# Patient Record
Sex: MALE | Race: WHITE | NOT HISPANIC OR LATINO
[De-identification: names, ages, dates, MRNs, and addresses within clinical notes are randomized per-mention and may not be internally consistent; named-entity substitution may affect disease eponyms.]

---

## 2015-06-10 RX ORDER — ATORVASTATIN CALCIUM 80 MG/1
1 TABLET, FILM COATED ORAL
Qty: 0 | Refills: 0 | DISCHARGE
Start: 2015-06-10

## 2016-01-15 RX ORDER — TAMSULOSIN HYDROCHLORIDE 0.4 MG/1
1 CAPSULE ORAL
Qty: 0 | Refills: 0 | DISCHARGE
Start: 2016-01-15

## 2017-01-20 ENCOUNTER — NON-APPOINTMENT (OUTPATIENT)
Age: 72
End: 2017-01-20

## 2017-01-20 ENCOUNTER — APPOINTMENT (OUTPATIENT)
Dept: ELECTROPHYSIOLOGY | Facility: CLINIC | Age: 72
End: 2017-01-20

## 2017-01-20 VITALS
HEART RATE: 84 BPM | SYSTOLIC BLOOD PRESSURE: 150 MMHG | WEIGHT: 210 LBS | DIASTOLIC BLOOD PRESSURE: 85 MMHG | BODY MASS INDEX: 33.75 KG/M2 | HEIGHT: 66 IN | OXYGEN SATURATION: 98 %

## 2017-04-24 ENCOUNTER — APPOINTMENT (OUTPATIENT)
Dept: ELECTROPHYSIOLOGY | Facility: CLINIC | Age: 72
End: 2017-04-24

## 2017-07-21 ENCOUNTER — NON-APPOINTMENT (OUTPATIENT)
Age: 72
End: 2017-07-21

## 2017-07-21 ENCOUNTER — APPOINTMENT (OUTPATIENT)
Dept: ELECTROPHYSIOLOGY | Facility: CLINIC | Age: 72
End: 2017-07-21

## 2017-07-21 VITALS — DIASTOLIC BLOOD PRESSURE: 88 MMHG | HEART RATE: 82 BPM | OXYGEN SATURATION: 95 % | SYSTOLIC BLOOD PRESSURE: 160 MMHG

## 2017-10-23 ENCOUNTER — APPOINTMENT (OUTPATIENT)
Dept: ELECTROPHYSIOLOGY | Facility: CLINIC | Age: 72
End: 2017-10-23
Payer: MEDICARE

## 2017-10-23 PROCEDURE — 93295 DEV INTERROG REMOTE 1/2/MLT: CPT

## 2018-01-26 ENCOUNTER — APPOINTMENT (OUTPATIENT)
Dept: ELECTROPHYSIOLOGY | Facility: CLINIC | Age: 73
End: 2018-01-26
Payer: MEDICARE

## 2018-01-26 VITALS
DIASTOLIC BLOOD PRESSURE: 98 MMHG | OXYGEN SATURATION: 97 % | HEART RATE: 88 BPM | BODY MASS INDEX: 35.36 KG/M2 | SYSTOLIC BLOOD PRESSURE: 180 MMHG | WEIGHT: 220 LBS | HEIGHT: 66 IN

## 2018-01-26 PROCEDURE — 93283 PRGRMG EVAL IMPLANTABLE DFB: CPT

## 2018-04-23 ENCOUNTER — APPOINTMENT (OUTPATIENT)
Dept: ELECTROPHYSIOLOGY | Facility: CLINIC | Age: 73
End: 2018-04-23

## 2018-04-30 ENCOUNTER — APPOINTMENT (OUTPATIENT)
Dept: ELECTROPHYSIOLOGY | Facility: CLINIC | Age: 73
End: 2018-04-30
Payer: MEDICARE

## 2018-04-30 PROCEDURE — 93296 REM INTERROG EVL PM/IDS: CPT

## 2018-04-30 PROCEDURE — 93295 DEV INTERROG REMOTE 1/2/MLT: CPT

## 2018-07-30 ENCOUNTER — APPOINTMENT (OUTPATIENT)
Dept: ELECTROPHYSIOLOGY | Facility: CLINIC | Age: 73
End: 2018-07-30
Payer: MEDICARE

## 2018-07-30 VITALS — SYSTOLIC BLOOD PRESSURE: 164 MMHG | OXYGEN SATURATION: 98 % | HEART RATE: 79 BPM | DIASTOLIC BLOOD PRESSURE: 96 MMHG

## 2018-07-30 PROCEDURE — 93283 PRGRMG EVAL IMPLANTABLE DFB: CPT

## 2018-10-15 ENCOUNTER — APPOINTMENT (OUTPATIENT)
Dept: ELECTROPHYSIOLOGY | Facility: CLINIC | Age: 73
End: 2018-10-15
Payer: MEDICARE

## 2018-10-15 PROCEDURE — 93296 REM INTERROG EVL PM/IDS: CPT

## 2018-10-15 PROCEDURE — 93295 DEV INTERROG REMOTE 1/2/MLT: CPT

## 2019-04-23 ENCOUNTER — APPOINTMENT (OUTPATIENT)
Dept: ELECTROPHYSIOLOGY | Facility: CLINIC | Age: 74
End: 2019-04-23
Payer: MEDICARE

## 2019-04-23 PROCEDURE — 93295 DEV INTERROG REMOTE 1/2/MLT: CPT

## 2019-04-23 PROCEDURE — 93296 REM INTERROG EVL PM/IDS: CPT

## 2019-07-24 ENCOUNTER — APPOINTMENT (OUTPATIENT)
Dept: ELECTROPHYSIOLOGY | Facility: HOSPITAL | Age: 74
End: 2019-07-24
Payer: MEDICARE

## 2019-07-24 PROCEDURE — 93296 REM INTERROG EVL PM/IDS: CPT

## 2019-07-24 PROCEDURE — 93295 DEV INTERROG REMOTE 1/2/MLT: CPT

## 2019-10-24 ENCOUNTER — APPOINTMENT (OUTPATIENT)
Dept: ELECTROPHYSIOLOGY | Facility: CLINIC | Age: 74
End: 2019-10-24
Payer: MEDICARE

## 2019-10-24 PROCEDURE — 93296 REM INTERROG EVL PM/IDS: CPT

## 2019-10-24 PROCEDURE — 93295 DEV INTERROG REMOTE 1/2/MLT: CPT

## 2020-01-23 ENCOUNTER — APPOINTMENT (OUTPATIENT)
Dept: ELECTROPHYSIOLOGY | Facility: CLINIC | Age: 75
End: 2020-01-23
Payer: MEDICARE

## 2020-01-23 PROCEDURE — 93296 REM INTERROG EVL PM/IDS: CPT

## 2020-01-23 PROCEDURE — 93295 DEV INTERROG REMOTE 1/2/MLT: CPT

## 2020-04-24 ENCOUNTER — APPOINTMENT (OUTPATIENT)
Dept: ELECTROPHYSIOLOGY | Facility: CLINIC | Age: 75
End: 2020-04-24
Payer: MEDICARE

## 2020-04-24 PROCEDURE — 93295 DEV INTERROG REMOTE 1/2/MLT: CPT

## 2020-04-24 PROCEDURE — 93296 REM INTERROG EVL PM/IDS: CPT

## 2020-07-24 ENCOUNTER — APPOINTMENT (OUTPATIENT)
Dept: ELECTROPHYSIOLOGY | Facility: CLINIC | Age: 75
End: 2020-07-24
Payer: MEDICARE

## 2020-07-24 PROCEDURE — 93296 REM INTERROG EVL PM/IDS: CPT

## 2020-07-24 PROCEDURE — 93295 DEV INTERROG REMOTE 1/2/MLT: CPT

## 2020-10-26 ENCOUNTER — APPOINTMENT (OUTPATIENT)
Dept: ELECTROPHYSIOLOGY | Facility: CLINIC | Age: 75
End: 2020-10-26
Payer: MEDICARE

## 2020-10-26 PROCEDURE — 93295 DEV INTERROG REMOTE 1/2/MLT: CPT

## 2020-10-26 PROCEDURE — 93296 REM INTERROG EVL PM/IDS: CPT

## 2021-02-03 ENCOUNTER — APPOINTMENT (OUTPATIENT)
Dept: ELECTROPHYSIOLOGY | Facility: CLINIC | Age: 76
End: 2021-02-03
Payer: MEDICARE

## 2021-02-03 PROCEDURE — 93295 DEV INTERROG REMOTE 1/2/MLT: CPT

## 2021-02-03 PROCEDURE — 93296 REM INTERROG EVL PM/IDS: CPT

## 2021-02-05 ENCOUNTER — NON-APPOINTMENT (OUTPATIENT)
Age: 76
End: 2021-02-05

## 2021-05-05 ENCOUNTER — NON-APPOINTMENT (OUTPATIENT)
Age: 76
End: 2021-05-05

## 2021-05-05 ENCOUNTER — APPOINTMENT (OUTPATIENT)
Dept: ELECTROPHYSIOLOGY | Facility: CLINIC | Age: 76
End: 2021-05-05
Payer: MEDICARE

## 2021-05-05 PROCEDURE — 93296 REM INTERROG EVL PM/IDS: CPT

## 2021-05-05 PROCEDURE — 93295 DEV INTERROG REMOTE 1/2/MLT: CPT

## 2021-08-03 ENCOUNTER — NON-APPOINTMENT (OUTPATIENT)
Age: 76
End: 2021-08-03

## 2021-08-03 ENCOUNTER — APPOINTMENT (OUTPATIENT)
Dept: ELECTROPHYSIOLOGY | Facility: CLINIC | Age: 76
End: 2021-08-03
Payer: MEDICARE

## 2021-08-03 PROCEDURE — 93296 REM INTERROG EVL PM/IDS: CPT

## 2021-08-03 PROCEDURE — 93295 DEV INTERROG REMOTE 1/2/MLT: CPT

## 2021-12-14 ENCOUNTER — NON-APPOINTMENT (OUTPATIENT)
Age: 76
End: 2021-12-14

## 2021-12-14 ENCOUNTER — APPOINTMENT (OUTPATIENT)
Dept: ELECTROPHYSIOLOGY | Facility: CLINIC | Age: 76
End: 2021-12-14
Payer: MEDICARE

## 2021-12-14 PROCEDURE — 93296 REM INTERROG EVL PM/IDS: CPT | Mod: NC

## 2021-12-14 PROCEDURE — 93295 DEV INTERROG REMOTE 1/2/MLT: CPT | Mod: NC

## 2022-03-07 ENCOUNTER — NON-APPOINTMENT (OUTPATIENT)
Age: 77
End: 2022-03-07

## 2022-03-17 ENCOUNTER — APPOINTMENT (OUTPATIENT)
Dept: ELECTROPHYSIOLOGY | Facility: CLINIC | Age: 77
End: 2022-03-17
Payer: MEDICARE

## 2022-03-17 ENCOUNTER — NON-APPOINTMENT (OUTPATIENT)
Age: 77
End: 2022-03-17

## 2022-03-17 PROCEDURE — 93296 REM INTERROG EVL PM/IDS: CPT

## 2022-03-17 PROCEDURE — 93295 DEV INTERROG REMOTE 1/2/MLT: CPT

## 2022-04-15 ENCOUNTER — NON-APPOINTMENT (OUTPATIENT)
Age: 77
End: 2022-04-15

## 2022-06-16 ENCOUNTER — APPOINTMENT (OUTPATIENT)
Dept: ELECTROPHYSIOLOGY | Facility: CLINIC | Age: 77
End: 2022-06-16
Payer: MEDICARE

## 2022-06-16 ENCOUNTER — NON-APPOINTMENT (OUTPATIENT)
Age: 77
End: 2022-06-16

## 2022-06-16 PROCEDURE — 93296 REM INTERROG EVL PM/IDS: CPT

## 2022-06-16 PROCEDURE — 93295 DEV INTERROG REMOTE 1/2/MLT: CPT

## 2022-09-19 ENCOUNTER — NON-APPOINTMENT (OUTPATIENT)
Age: 77
End: 2022-09-19

## 2022-09-19 ENCOUNTER — APPOINTMENT (OUTPATIENT)
Dept: ELECTROPHYSIOLOGY | Facility: CLINIC | Age: 77
End: 2022-09-19

## 2022-09-19 PROCEDURE — 93295 DEV INTERROG REMOTE 1/2/MLT: CPT

## 2022-09-19 PROCEDURE — 93296 REM INTERROG EVL PM/IDS: CPT

## 2022-09-28 ENCOUNTER — NON-APPOINTMENT (OUTPATIENT)
Age: 77
End: 2022-09-28

## 2022-09-28 RX ORDER — SITAGLIPTIN AND METFORMIN HYDROCHLORIDE 50; 1000 MG/1; MG/1
50-1000 TABLET, FILM COATED ORAL TWICE DAILY
Refills: 0 | Status: DISCONTINUED | COMMUNITY
Start: 2017-01-20 | End: 2022-09-28

## 2022-12-02 ENCOUNTER — NON-APPOINTMENT (OUTPATIENT)
Age: 77
End: 2022-12-02

## 2022-12-02 ENCOUNTER — APPOINTMENT (OUTPATIENT)
Dept: ELECTROPHYSIOLOGY | Facility: CLINIC | Age: 77
End: 2022-12-02

## 2022-12-02 VITALS
HEIGHT: 66 IN | WEIGHT: 200 LBS | HEART RATE: 71 BPM | BODY MASS INDEX: 32.14 KG/M2 | DIASTOLIC BLOOD PRESSURE: 82 MMHG | SYSTOLIC BLOOD PRESSURE: 147 MMHG | OXYGEN SATURATION: 95 %

## 2022-12-02 DIAGNOSIS — Z78.9 OTHER SPECIFIED HEALTH STATUS: ICD-10-CM

## 2022-12-02 DIAGNOSIS — Z95.810 PRESENCE OF AUTOMATIC (IMPLANTABLE) CARDIAC DEFIBRILLATOR: ICD-10-CM

## 2022-12-02 DIAGNOSIS — Z86.79 PERSONAL HISTORY OF OTHER DISEASES OF THE CIRCULATORY SYSTEM: ICD-10-CM

## 2022-12-02 DIAGNOSIS — Z45.02 ENCOUNTER FOR ADJUSTMENT AND MANAGEMENT OF AUTOMATIC IMPLANTABLE CARDIAC DEFIBRILLATOR: ICD-10-CM

## 2022-12-02 DIAGNOSIS — I10 ESSENTIAL (PRIMARY) HYPERTENSION: ICD-10-CM

## 2022-12-02 DIAGNOSIS — E78.5 HYPERLIPIDEMIA, UNSPECIFIED: ICD-10-CM

## 2022-12-02 PROCEDURE — 93000 ELECTROCARDIOGRAM COMPLETE: CPT

## 2022-12-02 PROCEDURE — 99214 OFFICE O/P EST MOD 30 MIN: CPT

## 2022-12-02 RX ORDER — ATORVASTATIN CALCIUM 40 MG/1
40 TABLET, FILM COATED ORAL
Qty: 90 | Refills: 1 | Status: ACTIVE | COMMUNITY
Start: 2017-07-21

## 2022-12-02 RX ORDER — CARVEDILOL 6.25 MG/1
6.25 TABLET, FILM COATED ORAL
Qty: 180 | Refills: 0 | Status: DISCONTINUED | COMMUNITY
Start: 2022-09-21

## 2022-12-02 RX ORDER — SOTALOL HYDROCHLORIDE 80 MG/1
80 TABLET ORAL
Refills: 0 | Status: ACTIVE | COMMUNITY
Start: 2022-09-28

## 2022-12-02 RX ORDER — DAPAGLIFLOZIN 5 MG/1
5 TABLET, FILM COATED ORAL
Qty: 90 | Refills: 1 | Status: ACTIVE | COMMUNITY
Start: 2022-12-02

## 2022-12-02 RX ORDER — METFORMIN ER 500 MG 500 MG/1
500 TABLET ORAL TWICE DAILY
Refills: 0 | Status: DISCONTINUED | COMMUNITY
Start: 2022-09-28 | End: 2022-12-02

## 2022-12-02 NOTE — HISTORY OF PRESENT ILLNESS
[FreeTextEntry1] : Mr. Gardiner is a 76 year old male with past medical history of hypertension, hyperlipidemia, atrial fibrillation status post cardioversion and ablation, heart failure with an EF of 29%, s/P ICD implant in 2015 who presents today for follow up visit. States the battery nearing SATISH, less than one month. ICD monitored by Dr Hernandes. On Sotalol 80 mg, Eliquis, and coreg. No s/s bleeding. Denies any chest pain, sob, palpitation or syncope. No ICD shocks. \par \par

## 2022-12-02 NOTE — DISCUSSION/SUMMARY
[EKG obtained to assist in diagnosis and management of assessed problem(s)] : EKG obtained to assist in diagnosis and management of assessed problem(s) [FreeTextEntry1] : Impression:\par \par 1. Chronic systolic CHF: s/p ICD placement. EKG performed today to assess for presence of appropriate pacing and reveals NSR. ICD now at SATISH. Recommend undergoing routine ICD gen change. We discussed the procedures, risks and outcomes of ICD generator change an living with an ICD. We discussed management of ICD therapy throughout life, including deactivation of the ICD. After all questions were answered, and literature was provided, it was a shared decision to proceed with ICD therapy. Hold diabetes medication and blood thinners the morning of the procedure, if applicable. May take all other medication with a small sip of water.\par \par 2. HTN: resume oral antihypertensives as prescribed. Encouraged heart healthy diet, sodium restriction, and weight loss. Continue regular f/u with Cardiologist for further HTN management.\par \par 3. HLD: resume statin therapy as prescribed and regular f/u with Cardiologist for routine lipid monitoring and management.\par \par Plan for ICD gen change.

## 2022-12-19 ENCOUNTER — NON-APPOINTMENT (OUTPATIENT)
Age: 77
End: 2022-12-19

## 2022-12-19 ENCOUNTER — APPOINTMENT (OUTPATIENT)
Dept: ELECTROPHYSIOLOGY | Facility: CLINIC | Age: 77
End: 2022-12-19
Payer: MEDICARE

## 2022-12-19 PROCEDURE — 93296 REM INTERROG EVL PM/IDS: CPT

## 2022-12-19 PROCEDURE — 93295 DEV INTERROG REMOTE 1/2/MLT: CPT

## 2023-01-13 ENCOUNTER — OUTPATIENT (OUTPATIENT)
Dept: OUTPATIENT SERVICES | Facility: HOSPITAL | Age: 78
LOS: 1 days | End: 2023-01-13

## 2023-01-13 VITALS
HEART RATE: 72 BPM | DIASTOLIC BLOOD PRESSURE: 85 MMHG | TEMPERATURE: 98 F | WEIGHT: 205.91 LBS | SYSTOLIC BLOOD PRESSURE: 154 MMHG | HEIGHT: 66 IN | RESPIRATION RATE: 16 BRPM

## 2023-01-13 DIAGNOSIS — E11.9 TYPE 2 DIABETES MELLITUS WITHOUT COMPLICATIONS: ICD-10-CM

## 2023-01-13 DIAGNOSIS — I50.22 CHRONIC SYSTOLIC (CONGESTIVE) HEART FAILURE: ICD-10-CM

## 2023-01-13 DIAGNOSIS — Z98.89 OTHER SPECIFIED POSTPROCEDURAL STATES: Chronic | ICD-10-CM

## 2023-01-13 DIAGNOSIS — I48.91 UNSPECIFIED ATRIAL FIBRILLATION: ICD-10-CM

## 2023-01-13 DIAGNOSIS — I10 ESSENTIAL (PRIMARY) HYPERTENSION: ICD-10-CM

## 2023-01-13 DIAGNOSIS — Z95.810 PRESENCE OF AUTOMATIC (IMPLANTABLE) CARDIAC DEFIBRILLATOR: Chronic | ICD-10-CM

## 2023-01-13 LAB
A1C WITH ESTIMATED AVERAGE GLUCOSE RESULT: 6.8 % — HIGH (ref 4–5.6)
ALBUMIN SERPL ELPH-MCNC: 3.9 G/DL — SIGNIFICANT CHANGE UP (ref 3.3–5)
ALP SERPL-CCNC: 49 U/L — SIGNIFICANT CHANGE UP (ref 40–120)
ALT FLD-CCNC: 34 U/L — SIGNIFICANT CHANGE UP (ref 4–41)
ANION GAP SERPL CALC-SCNC: 10 MMOL/L — SIGNIFICANT CHANGE UP (ref 7–14)
AST SERPL-CCNC: 24 U/L — SIGNIFICANT CHANGE UP (ref 4–40)
BILIRUB SERPL-MCNC: 0.6 MG/DL — SIGNIFICANT CHANGE UP (ref 0.2–1.2)
BLD GP AB SCN SERPL QL: NEGATIVE — SIGNIFICANT CHANGE UP
BUN SERPL-MCNC: 16 MG/DL — SIGNIFICANT CHANGE UP (ref 7–23)
CALCIUM SERPL-MCNC: 9.1 MG/DL — SIGNIFICANT CHANGE UP (ref 8.4–10.5)
CHLORIDE SERPL-SCNC: 103 MMOL/L — SIGNIFICANT CHANGE UP (ref 98–107)
CO2 SERPL-SCNC: 27 MMOL/L — SIGNIFICANT CHANGE UP (ref 22–31)
CREAT SERPL-MCNC: 0.99 MG/DL — SIGNIFICANT CHANGE UP (ref 0.5–1.3)
EGFR: 78 ML/MIN/1.73M2 — SIGNIFICANT CHANGE UP
ESTIMATED AVERAGE GLUCOSE: 148 — SIGNIFICANT CHANGE UP
GLUCOSE SERPL-MCNC: 173 MG/DL — HIGH (ref 70–99)
HCT VFR BLD CALC: 42.1 % — SIGNIFICANT CHANGE UP (ref 39–50)
HGB BLD-MCNC: 13.9 G/DL — SIGNIFICANT CHANGE UP (ref 13–17)
MCHC RBC-ENTMCNC: 30.5 PG — SIGNIFICANT CHANGE UP (ref 27–34)
MCHC RBC-ENTMCNC: 33 GM/DL — SIGNIFICANT CHANGE UP (ref 32–36)
MCV RBC AUTO: 92.5 FL — SIGNIFICANT CHANGE UP (ref 80–100)
NRBC # BLD: 0 /100 WBCS — SIGNIFICANT CHANGE UP (ref 0–0)
NRBC # FLD: 0 K/UL — SIGNIFICANT CHANGE UP (ref 0–0)
PLATELET # BLD AUTO: 188 K/UL — SIGNIFICANT CHANGE UP (ref 150–400)
POTASSIUM SERPL-MCNC: 4.1 MMOL/L — SIGNIFICANT CHANGE UP (ref 3.5–5.3)
POTASSIUM SERPL-SCNC: 4.1 MMOL/L — SIGNIFICANT CHANGE UP (ref 3.5–5.3)
PROT SERPL-MCNC: 7.3 G/DL — SIGNIFICANT CHANGE UP (ref 6–8.3)
RBC # BLD: 4.55 M/UL — SIGNIFICANT CHANGE UP (ref 4.2–5.8)
RBC # FLD: 13.4 % — SIGNIFICANT CHANGE UP (ref 10.3–14.5)
RH IG SCN BLD-IMP: POSITIVE — SIGNIFICANT CHANGE UP
SODIUM SERPL-SCNC: 140 MMOL/L — SIGNIFICANT CHANGE UP (ref 135–145)
WBC # BLD: 7.52 K/UL — SIGNIFICANT CHANGE UP (ref 3.8–10.5)
WBC # FLD AUTO: 7.52 K/UL — SIGNIFICANT CHANGE UP (ref 3.8–10.5)

## 2023-01-13 NOTE — H&P PST ADULT - PROBLEM SELECTOR PLAN 3
The patient was instructed by cardiology to hold Farxiga 4 days preop and to hold Metformin the morning of surgery.

## 2023-01-13 NOTE — H&P PST ADULT - PROBLEM SELECTOR PLAN 1
Pt scheduled for surgery on 1/26/23.  Pre-op instructions provided. Pt verbalized understanding.   Pt given detailed verbal and written instructions on chlorhexidine wash. Pt verbalized understanding with teachback.   Order placed for preop COVID PCR testing. Pt scheduled for surgery on 1/26/23.  Pre-op instructions provided. Pt verbalized understanding.   Pt given detailed verbal and written instructions on chlorhexidine wash. Pt verbalized understanding with teachback.   Order placed for preop COVID PCR testing.  ROSEANN precautions. Pt with >3 criteria on STOP-Bang Questionairre. Pt scheduled for surgery on 1/26/23.  Pre-op instructions provided. Pt verbalized understanding.   Pt given detailed verbal and written instructions on chlorhexidine wash. Pt verbalized understanding with teachback.   Order placed for preop COVID PCR testing.  ROSEANN precautions. Pt with >3 criteria on STOP-Bang Questionairre.  OR booking notified of AICD

## 2023-01-13 NOTE — H&P PST ADULT - TEMPERATURE IN CELSIUS (DEGREES C)
Return to the ER for worsening pain or further concerns  Take tylenol or motrin for pain relief
36.9

## 2023-01-13 NOTE — H&P PST ADULT - NSICDXFAMILYHX_GEN_ALL_CORE_FT
FAMILY HISTORY:  Father  Still living? Unknown  Family history of heart attack, Age at diagnosis: Age Unknown    Mother  Still living? Unknown  FH: ovarian cancer, Age at diagnosis: Age Unknown    Sibling  Still living? No  Family history of heart attack, Age at diagnosis: Age Unknown

## 2023-01-13 NOTE — H&P PST ADULT - HISTORY OF PRESENT ILLNESS
77 year old male with hx of heart failure s/p ICD in 2015 presents today for presurgical evaluation for .... 77 year old male with hx of heart failure s/p ICD in 2015 presents today for presurgical evaluation for MDT ICD Gen Change.

## 2023-01-13 NOTE — H&P PST ADULT - NSANTHOSAYNRD_GEN_A_CORE
No. ROSEANN screening performed.  STOP BANG Legend: 0-2 = LOW Risk; 3-4 = INTERMEDIATE Risk; 5-8 = HIGH Risk

## 2023-01-13 NOTE — H&P PST ADULT - NSICDXPASTMEDICALHX_GEN_ALL_CORE_FT
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

## 2023-01-13 NOTE — H&P PST ADULT - PROBLEM SELECTOR PLAN 2
Pt was instructed by cardiology to hold Eliquis the day before and day of surgery.  Pt instructed to take his Sotolol the morning of surgery.

## 2023-01-26 ENCOUNTER — OUTPATIENT (OUTPATIENT)
Dept: OUTPATIENT SERVICES | Facility: HOSPITAL | Age: 78
LOS: 1 days | Discharge: HOME | End: 2023-01-26
Payer: MEDICARE

## 2023-01-26 DIAGNOSIS — Z95.810 PRESENCE OF AUTOMATIC (IMPLANTABLE) CARDIAC DEFIBRILLATOR: Chronic | ICD-10-CM

## 2023-01-26 DIAGNOSIS — Z98.89 OTHER SPECIFIED POSTPROCEDURAL STATES: Chronic | ICD-10-CM

## 2023-01-26 DIAGNOSIS — I50.22 CHRONIC SYSTOLIC (CONGESTIVE) HEART FAILURE: ICD-10-CM

## 2023-01-26 LAB
GLUCOSE BLDC GLUCOMTR-MCNC: 146 MG/DL — HIGH (ref 70–99)
GLUCOSE BLDC GLUCOMTR-MCNC: 163 MG/DL — HIGH (ref 70–99)

## 2023-01-26 PROCEDURE — 93010 ELECTROCARDIOGRAM REPORT: CPT

## 2023-01-26 PROCEDURE — 33263 RMVL & RPLCMT DFB GEN 2 LEAD: CPT

## 2023-01-26 RX ORDER — SODIUM CHLORIDE 9 MG/ML
3 INJECTION INTRAMUSCULAR; INTRAVENOUS; SUBCUTANEOUS EVERY 8 HOURS
Refills: 0 | Status: DISCONTINUED | OUTPATIENT
Start: 2023-01-26 | End: 2023-02-09

## 2023-01-26 NOTE — CHART NOTE - NSCHARTNOTEFT_GEN_A_CORE
In brief this is a 78 y/o M with PMH of Atrial fibrillation(on Eliquis), DM type II, HTN, HLD, BPH, NICM(s/p AICD implant in 2015) presented to [facility] for AICD generator change. Patient stated that he has been in his usual state of health and denied any current complaints such as CP, SOB, palpitations, VALLES, lightheadedness/dizziness. Reviewed medication list with patient and updated on patient's chart. Explained about the procedure in detail to the patient and daughter at bedside. All risks/benefits explained and patient understood and signed all the consents. Reviewed pre-surgical testing H&P. Patient last took his Farxiga on 01/22/23 and Eliquis on 01/24/23.     EKG: In brief this is a 76 y/o M with PMH of Atrial fibrillation(on Eliquis), DM type II, HTN, HLD, BPH, NICM(s/p AICD implant in 2015) presented to [facility] for AICD generator change. Patient stated that he has been in his usual state of health and denied any current complaints such as CP, SOB, palpitations, VALLES, lightheadedness/dizziness. Reviewed medication list with patient and updated on patient's chart. Explained about the procedure in detail to the patient and daughter at bedside. All risks/benefits explained and patient understood and signed all the consents. Reviewed pre-surgical testing H&P. Patient last took his Farxiga on 01/22/23 and Eliquis on 01/24/23.     COVID PCR not detected on 01/23/23    EKG: Sinus rhythm with 1st degree AV block at a rate of 65 with QTc of 480

## 2023-01-26 NOTE — CHART NOTE - NSCHARTNOTEFT_GEN_A_CORE
Spoke with Dr. Torres and he recommended that patient can restart his Eliquis medication in 1 week and can restart his Farxiga tomorrow morning(1/27). This was relayed to patient on discharge paperwork.

## 2023-01-26 NOTE — CHART NOTE - NSCHARTNOTEFT_GEN_A_CORE
pt. s/p ICD PG change    L ACW site  was pressure dressed. It was  clean, dry, and intact. No bleeding, drainage hematoma, or ecchymosis appreciated.        Post-op ICD PG change instruction has been verbally explained and given to the patient. Patient was also given home monitor, booklet and ID card. Patient expressed understanding and all questions were answered. A copy of the instruction is located in the patients chart   Patient is schedule for an appointment on 2/10/23 at 11:40am in the EP Clinic ( 4th floor Oncology building [facility] [address], [place], [place], [address] [phone] )    No scrubbing the incision site for 2 weeks  No lotion, ointment, powder or direct sunlight to the incision site for 2 weeks   No lifting more than 5lb or exertional exercising such as jogging, running, bike riding for 6-8 weeks  Do not get the surgical incision wet for 1 week  No swimming pool, Jacuzzi, or bath for 6-8 weeks.   You should carry your ID card for metal detectors   Remove bandage after 24-48hours  Patient can shower 24 hours after procedure. Pat the area dry  Keep Cellular phone 6 in away  from the device  Pt was instructed to call [phone] if the following occurs:      - fever with temperature > 100.6      - swelling, drainage or bleeding at the site incision       - chest pain, SOB, n/v      - if you believe you were shock and then go to the ED

## 2023-02-10 ENCOUNTER — APPOINTMENT (OUTPATIENT)
Dept: ELECTROPHYSIOLOGY | Facility: CLINIC | Age: 78
End: 2023-02-10
Payer: MEDICARE

## 2023-02-10 VITALS — RESPIRATION RATE: 14 BRPM | HEART RATE: 100 BPM | SYSTOLIC BLOOD PRESSURE: 157 MMHG | DIASTOLIC BLOOD PRESSURE: 83 MMHG

## 2023-02-10 DIAGNOSIS — I50.22 CHRONIC SYSTOLIC (CONGESTIVE) HEART FAILURE: ICD-10-CM

## 2023-02-10 PROCEDURE — 99024 POSTOP FOLLOW-UP VISIT: CPT

## 2023-02-10 RX ORDER — DILTIAZEM HYDROCHLORIDE 120 MG/1
120 CAPSULE, EXTENDED RELEASE ORAL
Refills: 0 | Status: ACTIVE | COMMUNITY

## 2023-02-10 RX ORDER — METFORMIN HYDROCHLORIDE 1000 MG/1
1000 TABLET, COATED ORAL
Refills: 0 | Status: ACTIVE | COMMUNITY
Start: 2022-10-31

## 2023-03-20 ENCOUNTER — APPOINTMENT (OUTPATIENT)
Dept: ELECTROPHYSIOLOGY | Facility: CLINIC | Age: 78
End: 2023-03-20

## 2023-07-25 PROBLEM — Z87.438 PERSONAL HISTORY OF OTHER DISEASES OF MALE GENITAL ORGANS: Chronic | Status: ACTIVE | Noted: 2023-01-13

## 2023-07-25 PROBLEM — E11.9 TYPE 2 DIABETES MELLITUS WITHOUT COMPLICATIONS: Chronic | Status: ACTIVE | Noted: 2023-01-13

## 2023-07-27 ENCOUNTER — NON-APPOINTMENT (OUTPATIENT)
Age: 78
End: 2023-07-27

## 2023-08-01 ENCOUNTER — TRANSCRIPTION ENCOUNTER (OUTPATIENT)
Age: 78
End: 2023-08-01

## 2023-08-01 ENCOUNTER — OUTPATIENT (OUTPATIENT)
Dept: OUTPATIENT SERVICES | Facility: HOSPITAL | Age: 78
LOS: 1 days | End: 2023-08-01
Payer: MEDICARE

## 2023-08-01 VITALS
DIASTOLIC BLOOD PRESSURE: 89 MMHG | WEIGHT: 210.1 LBS | HEIGHT: 66 IN | TEMPERATURE: 98 F | OXYGEN SATURATION: 97 % | RESPIRATION RATE: 17 BRPM | SYSTOLIC BLOOD PRESSURE: 180 MMHG | HEART RATE: 64 BPM

## 2023-08-01 VITALS
OXYGEN SATURATION: 95 % | SYSTOLIC BLOOD PRESSURE: 125 MMHG | RESPIRATION RATE: 16 BRPM | DIASTOLIC BLOOD PRESSURE: 66 MMHG | HEART RATE: 66 BPM

## 2023-08-01 DIAGNOSIS — Z95.810 PRESENCE OF AUTOMATIC (IMPLANTABLE) CARDIAC DEFIBRILLATOR: Chronic | ICD-10-CM

## 2023-08-01 DIAGNOSIS — Z98.89 OTHER SPECIFIED POSTPROCEDURAL STATES: Chronic | ICD-10-CM

## 2023-08-01 DIAGNOSIS — R94.39 ABNORMAL RESULT OF OTHER CARDIOVASCULAR FUNCTION STUDY: ICD-10-CM

## 2023-08-01 LAB
ANION GAP SERPL CALC-SCNC: 15 MMOL/L — SIGNIFICANT CHANGE UP (ref 5–17)
BUN SERPL-MCNC: 18 MG/DL — SIGNIFICANT CHANGE UP (ref 7–23)
CALCIUM SERPL-MCNC: 9.2 MG/DL — SIGNIFICANT CHANGE UP (ref 8.4–10.5)
CHLORIDE SERPL-SCNC: 106 MMOL/L — SIGNIFICANT CHANGE UP (ref 96–108)
CO2 SERPL-SCNC: 20 MMOL/L — LOW (ref 22–31)
CREAT SERPL-MCNC: 0.96 MG/DL — SIGNIFICANT CHANGE UP (ref 0.5–1.3)
EGFR: 81 ML/MIN/1.73M2 — SIGNIFICANT CHANGE UP
GLUCOSE BLDC GLUCOMTR-MCNC: 149 MG/DL — HIGH (ref 70–99)
GLUCOSE SERPL-MCNC: 185 MG/DL — HIGH (ref 70–99)
HCT VFR BLD CALC: 42 % — SIGNIFICANT CHANGE UP (ref 39–50)
HGB BLD-MCNC: 14.1 G/DL — SIGNIFICANT CHANGE UP (ref 13–17)
MCHC RBC-ENTMCNC: 31 PG — SIGNIFICANT CHANGE UP (ref 27–34)
MCHC RBC-ENTMCNC: 33.6 GM/DL — SIGNIFICANT CHANGE UP (ref 32–36)
MCV RBC AUTO: 92.3 FL — SIGNIFICANT CHANGE UP (ref 80–100)
NRBC # BLD: 0 /100 WBCS — SIGNIFICANT CHANGE UP (ref 0–0)
PLATELET # BLD AUTO: 181 K/UL — SIGNIFICANT CHANGE UP (ref 150–400)
POTASSIUM SERPL-MCNC: 4.5 MMOL/L — SIGNIFICANT CHANGE UP (ref 3.5–5.3)
POTASSIUM SERPL-SCNC: 4.5 MMOL/L — SIGNIFICANT CHANGE UP (ref 3.5–5.3)
RBC # BLD: 4.55 M/UL — SIGNIFICANT CHANGE UP (ref 4.2–5.8)
RBC # FLD: 13.8 % — SIGNIFICANT CHANGE UP (ref 10.3–14.5)
SODIUM SERPL-SCNC: 141 MMOL/L — SIGNIFICANT CHANGE UP (ref 135–145)
WBC # BLD: 7.32 K/UL — SIGNIFICANT CHANGE UP (ref 3.8–10.5)
WBC # FLD AUTO: 7.32 K/UL — SIGNIFICANT CHANGE UP (ref 3.8–10.5)

## 2023-08-01 PROCEDURE — 85027 COMPLETE CBC AUTOMATED: CPT

## 2023-08-01 PROCEDURE — 80048 BASIC METABOLIC PNL TOTAL CA: CPT

## 2023-08-01 PROCEDURE — C1874: CPT

## 2023-08-01 PROCEDURE — 82962 GLUCOSE BLOOD TEST: CPT

## 2023-08-01 PROCEDURE — 93010 ELECTROCARDIOGRAM REPORT: CPT

## 2023-08-01 PROCEDURE — C1725: CPT

## 2023-08-01 PROCEDURE — C1894: CPT

## 2023-08-01 PROCEDURE — C1769: CPT

## 2023-08-01 PROCEDURE — 99152 MOD SED SAME PHYS/QHP 5/>YRS: CPT

## 2023-08-01 PROCEDURE — C9600: CPT | Mod: LD

## 2023-08-01 PROCEDURE — C1887: CPT

## 2023-08-01 PROCEDURE — 93458 L HRT ARTERY/VENTRICLE ANGIO: CPT | Mod: 26,59

## 2023-08-01 PROCEDURE — 93005 ELECTROCARDIOGRAM TRACING: CPT

## 2023-08-01 PROCEDURE — 93458 L HRT ARTERY/VENTRICLE ANGIO: CPT | Mod: 59

## 2023-08-01 PROCEDURE — 92928 PRQ TCAT PLMT NTRAC ST 1 LES: CPT | Mod: LD

## 2023-08-01 RX ORDER — METFORMIN HYDROCHLORIDE 850 MG/1
1 TABLET ORAL
Qty: 0 | Refills: 0 | DISCHARGE

## 2023-08-01 RX ORDER — CARVEDILOL PHOSPHATE 80 MG/1
1 CAPSULE, EXTENDED RELEASE ORAL
Qty: 0 | Refills: 0 | DISCHARGE

## 2023-08-01 RX ORDER — CLOPIDOGREL BISULFATE 75 MG/1
1 TABLET, FILM COATED ORAL
Qty: 90 | Refills: 3
Start: 2023-08-01 | End: 2024-07-25

## 2023-08-01 RX ORDER — DAPAGLIFLOZIN 10 MG/1
1 TABLET, FILM COATED ORAL
Qty: 0 | Refills: 0 | DISCHARGE

## 2023-08-01 RX ORDER — SOTALOL HCL 120 MG
1 TABLET ORAL
Qty: 0 | Refills: 0 | DISCHARGE

## 2023-08-01 RX ORDER — DILTIAZEM HCL 120 MG
1 CAPSULE, EXT RELEASE 24 HR ORAL
Refills: 0 | DISCHARGE

## 2023-08-01 RX ORDER — ASPIRIN/CALCIUM CARB/MAGNESIUM 324 MG
1 TABLET ORAL
Qty: 7 | Refills: 0
Start: 2023-08-01 | End: 2023-08-07

## 2023-08-01 NOTE — H&P CARDIOLOGY - NSICDXPASTMEDICALHX_GEN_ALL_CORE_FT
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

## 2023-08-01 NOTE — ASU DISCHARGE PLAN (ADULT/PEDIATRIC) - CARE PROVIDER_API CALL
Armin Ro  Cardiovascular Disease  [address] [place] [address]  Phone: [phone]  Fax: [phone]  Follow Up Time:

## 2023-08-01 NOTE — H&P CARDIOLOGY - HISTORY OF PRESENT ILLNESS
This is a 76 y/o  male with known PPM/AICD COVID 19 negative Vaccinated with Moderna x 3 with  PMHx of HTN, HLD, AFib s/p cardioversion on Eliquis last dose on 7/31/23 am dose at 0730am , CHF ( EF--29% ),and hx melena, DM2 compliant with meds on Farxiga ( last dose 3 days ago ) Metformin uncomplicated compliant with meds .  Currently denies chest pain or SOB. Pt recently had abnormal stress test . Now referred for [facility] today with  .   Cardiologist Dr. Ro.   < from: Cardiac Rhythm Management (01.26.23 @ 13:56) >  Secondary ICD-10   I50.22 - Chronic systolic (congestive) heart failure     CPT Code:                  06317 - ICD Generator Change, Dual     Procedures Performed   Primary Procedures:      Dual chamber ICD Gen change     Conclusions   Successful dual chamber ICD generator change     Follow-Up   Wound check in 10-14 days.      Complications   No complications     Procedure Narrative       < end of copied text >  < from: Transthoracic Echocardiogram (01.15.16 @ 18:06) >  Conclusions:  1. Normal left ventricular internal dimensions and wall  thicknesses.  2. Normal left ventricular systolic function. No segmental  wall motion abnormalities.  *** Compared with echocardiogram of 9/28/2015, there is an  improvement in left ventricular systolic function.  ------------------------------------------------------------------------  Confirmed on  1/15/2016 - 12:29:39 by Jacinto Estrada M.D.  ------------------------------------------------------------------------    < end of copied text >  < from: Cardiac Cath Lab - Adult (06.08.15 @ 13:41) >  CORONARY VESSELS: The coronary circulation is co-dominant.  LM:   --  LM: Normal.  LAD:   --  Proximal LAD: Angiography showed minor luminal irregularities  with no flow limiting lesions.  --Mid LAD: Angiography showed mild atherosclerosis with no flow limiting  lesions.  --  Distal LAD: Angiography showed minor luminal irregularities with no  flow limiting lesions.  --  D1: There was a discrete 40 % stenosis.  CX:   --  Circumflex: Angiography showed minor luminal irregularities with  no flow limiting lesions.  RCA:   --  Proximal RCA: Angiography showed mild atherosclerosis with no  flow limiting lesions.  --  Mid RCA: There was a diffuse 20 % stenosis.  --  Distal RCA: Angiographyshowed mild atherosclerosis with no flow  limiting lesions.  --  RPDA: The vessel was small sized. There was a discrete 60 % stenosis.  --  RPLS: Angiography showed mild atherosclerosis with no flow limiting  lesions.  COMPLICATIONS: There were no complications.  DIAGNOSTIC RECOMMENDATIONS: Coronary angiogram demonstrates moderate CAD.  The patient's cardiomyopathy is out of proportion to CAD. Will provide  medical management of cardiomyopathy and CAD and aggressive risk factor  modification.  Prepared and signed by  Alfreda May M.D.  Signed 06/09/2015 15:20:52    < end of copied text >     This is a 76 y/o  male with known PPM/AICD COVID 19 negative Vaccinated with Moderna x 3 with  PMHx of HTN, HLD, AFib s/p cardioversion on Eliquis last dose on 7/30/23 PM dose , CHF ( EF--29% ),and hx melena, DM2 compliant with meds on Farxiga ( last dose 5 days ago ) Metformin uncomplicated compliant with meds .  Currently denies chest pain or SOB. Pt recently had abnormal stress test . Now referred for [facility] today with  .   Cardiologist Dr. Ro.   < from: Cardiac Rhythm Management (01.26.23 @ 13:56) >  Secondary ICD-10   I50.22 - Chronic systolic (congestive) heart failure     CPT Code:                  55615 - ICD Generator Change, Dual     Procedures Performed   Primary Procedures:      Dual chamber ICD Gen change     Conclusions   Successful dual chamber ICD generator change     Follow-Up   Wound check in 10-14 days.      Complications   No complications     Procedure Narrative       < end of copied text >  < from: Transthoracic Echocardiogram (01.15.16 @ 18:06) >  Conclusions:  1. Normal left ventricular internal dimensions and wall  thicknesses.  2. Normal left ventricular systolic function. No segmental  wall motion abnormalities.  *** Compared with echocardiogram of 9/28/2015, there is an  improvement in left ventricular systolic function.  ------------------------------------------------------------------------  Confirmed on  1/15/2016 - 12:29:39 by Jacinto Estrada M.D.  ------------------------------------------------------------------------    < end of copied text >  < from: Cardiac Cath Lab - Adult (06.08.15 @ 13:41) >  CORONARY VESSELS: The coronary circulation is co-dominant.  LM:   --  LM: Normal.  LAD:   --  Proximal LAD: Angiography showed minor luminal irregularities  with no flow limiting lesions.  --Mid LAD: Angiography showed mild atherosclerosis with no flow limiting  lesions.  --  Distal LAD: Angiography showed minor luminal irregularities with no  flow limiting lesions.  --  D1: There was a discrete 40 % stenosis.  CX:   --  Circumflex: Angiography showed minor luminal irregularities with  no flow limiting lesions.  RCA:   --  Proximal RCA: Angiography showed mild atherosclerosis with no  flow limiting lesions.  --  Mid RCA: There was a diffuse 20 % stenosis.  --  Distal RCA: Angiographyshowed mild atherosclerosis with no flow  limiting lesions.  --  RPDA: The vessel was small sized. There was a discrete 60 % stenosis.  --  RPLS: Angiography showed mild atherosclerosis with no flow limiting  lesions.  COMPLICATIONS: There were no complications.  DIAGNOSTIC RECOMMENDATIONS: Coronary angiogram demonstrates moderate CAD.  The patient's cardiomyopathy is out of proportion to CAD. Will provide  medical management of cardiomyopathy and CAD and aggressive risk factor  modification.  Prepared and signed by  Alfreda May M.D.  Signed 06/09/2015 15:20:52    < end of copied text >

## 2023-08-01 NOTE — ASU DISCHARGE PLAN (ADULT/PEDIATRIC) - NS MD DC FALL RISK RISK
For information on Fall & Injury Prevention, visit: [email or web address] OR  [email or web address] OR  https://www.cdc.gov/steadi/patient.html

## 2023-08-01 NOTE — ASU DISCHARGE PLAN (ADULT/PEDIATRIC) - ASU DC SPECIAL INSTRUCTIONSFT
Wound Care:   the day AFTER your procedure remove bandage GENTLY, and clean using  mild soap and gentle warm, water stream, pat dry. leave OPEN to air. YOU MAY SHOWER   DO NOT apply lotions, creams, ointments, powder, perfumes to your incision site  DO NOT SOAK your site for 1 week ( no baths, no pools, no tubs, etc...)  Check  your groin and /or wrist daily.A small amount of bruising, and soarness are normal    ACTIVITY: for 24 hours   - DO NOT DRIVE  - DO NOT make any important decisions or sign legal documents   - DO NOT operate heavy machineries   - you may resume sexual activity in 48 hours, unless otherwise instructed by your cardiologist     If your procedure was done through the WRIST: for the NEXT 3DAYS:  - avoid pushing, pulling, with that affected wrist   - avoid repeated movement of that hand and wrist ( eg: typing, hammering)  - DO NOT LIFT anything more than 5 lbs     If your procedure was done through the GROIN: for the NEXT 5 DAYS  - Limit climbing stairs, DO NOT soak in bathtub or pool  - no strenous activities, pushing, pulling, straining  - Do not lift anything 10lbs or heavier     MEDICATION:   take your medications as explained ( see discharge paperwork)   If you received a STENT, you will be taking antiplatelet medications to KEEP YOUR STENT OPEN ( eg: Aspirin, Plavix, Brilinta, Effient, etc).  Take as prescribed DO NOT STOP taking them without consulting with your cardiologist first.     Follow heart healthy diet recommended by your doctor, , if you smoke STOP SMOKING ( may call [phone] for center of tobacco control if you need assistance)     CALL your doctor to make appointment in 2 WEEKS     ***CALL YOUR DOCTOR***  if you experience: fever, chills, body aches, or severe pain, swelling, redness, heat or yellow discharge at incision site  If you experience Bleeding or excruciating pain at the procedural site, sweliing ( golf ball size) at your procedural site  If you experience CHEST PAIN  If you experience extremity numbness, tingling, temperature change ( of your procedural site)   If you are unable to reach your doctor, you may contact:   -Cardiology Office at [facility] at [phone] or   - [facility] [phone]  - [facility] [phone]

## 2024-08-01 ENCOUNTER — APPOINTMENT (OUTPATIENT)
Dept: PULMONOLOGY | Facility: CLINIC | Age: 79
End: 2024-08-01
Payer: MEDICARE

## 2024-08-01 VITALS
SYSTOLIC BLOOD PRESSURE: 147 MMHG | DIASTOLIC BLOOD PRESSURE: 72 MMHG | TEMPERATURE: 98.2 F | WEIGHT: 225 LBS | HEIGHT: 66 IN | OXYGEN SATURATION: 94 % | BODY MASS INDEX: 36.16 KG/M2 | HEART RATE: 77 BPM

## 2024-08-01 DIAGNOSIS — Z87.891 PERSONAL HISTORY OF NICOTINE DEPENDENCE: ICD-10-CM

## 2024-08-01 PROCEDURE — 99204 OFFICE O/P NEW MOD 45 MIN: CPT

## 2024-08-01 PROCEDURE — G2211 COMPLEX E/M VISIT ADD ON: CPT

## 2024-08-01 NOTE — PROCEDURE
[FreeTextEntry1] : CT scan of the chest without contrast History: Pulmonary nodules Comparison: CT scan of the chest performed on 9-18-15 Technique: Axial scans sections were obtained through the chest using a multislice spiral CT scanner. Axial high-resolution and coronal and sagittal reconstructions were obtained. Dose reduction software was used to minimize patient radiation exposure. Radiation dose: Total exam DLP: 372 mGy/cm. Findings: Lungs: Many of the scans particularly through the mid and lower lung fields are degraded by respiratory motion artifact. There are a few small noncalcified and calcified subpleural lung nodules along the posterior pleural surface of the right lower lobe including a 3 mm noncalcified nodule along the posteromedial pleural surface (series 4/image #97), another 2 mm noncalcified subpleural nodule slightly more inferiorly along the posteromedial pleural surface the right lower lobe (series 4/image #111), a 2 mm calcified subpleural granuloma along the posterior pleural surface of the right lower lobe (series 4/image #91), and a 3 mm noncalcified nodule along the posterolateral pleural surface of the right lower lobe (series 4/image #78). These nodules were not seen on the prior CT study however were probably obscured by a prior small to moderate sized pleural effusion which has since resolved. There are two small zakia-fissural nodules measuring 4 mm and 3 mm along the superior left major fissure (series 4/images #41,43). There is a 4 mm noncalcified subpleural nodule along the lateral pleural surface of the right lower lobe (series 4/image #99), a 2 mm noncalcified lung nodule laterally within the right middle lobe (series 4/image #75), and a 2 mm noncalcified subpleural nodule along the lateral pleural surface the left lower lobe (series 4/image #87). No infiltrates or consolidations are seen. Mediastinum: Few small mediastinal lymph nodes within the precarinal and prevascular regions a few of which appear diminished in size when compared to the prior CT study. [place]: Negative (noncontrast) Cardiac: Left-sided cardiac defibrillator. Atherosclerotic calcification within the thoracic aorta and coronary arteries. No evidence of a thoracic aortic aneurysm. There is fluid surrounding the ascending thoracic aorta probably within pericardial recesses. Pleura: Interval resolution of a small to moderate sized right pleural effusion and small left pleural effusion since the prior CT study. Bony thorax: Degenerative changes within the thoracic spine. Other: Negative Report title: CT CT CHEST WO IV CONTRAST - 2024 11:34 am Patient Id: [id number][DEFAULT] Patient name: Filipe Albright : 27 Dec, 1945 Exam date: 2024 10:51 am Report status: Final 24, 11:41 AM Change [employer] Viewer [email or web address] IMPRESSION: Few small mostly noncalcified subpleural lung nodules along the posterior and posterolateral pleural surfaces the right lower lobe which were not seen on the prior CT study of 9-18-15 however were probably obscured by the small to moderate sized right pleural effusion which has since resolved. Two new small zakia-fissural nodules along the superior left major fissure. Stable few other small scattered noncalcified lung nodules in both lungs. Continued follow-up with a repeat CT scan of the chest in 12 months is recommended. Few small mediastinal lymph nodes within the precarinal and prevascular regions a few of which appear diminished in size when compared to the prior CT study. Electronically signed by Jh Amador MD 2024 11:34 AM    Ref. Physician: Jorge Luis Ro [id number] [place] [address] Ref. Physician: JORGE LUIS RO MD NPI: [id number] INAL REPORT ********** Org: [facility] - Radiology Report Patient Name: FILIPE ALBRIGHT MRN: [id number] Accession: [id number] Exam Desc: CT Chest WO Exam Date/Time: 2015 02:53 PM Requesting Physician: JORGE LUIS RO MD Reason for Exam: COUGH CT scan chest. Clinical history: Difficulty breathing. CT scan chest performed without contrast. No evidence of significant axillary or hilar lymphadenopathy on this noncontrast examination. There is a 1.7 x 1 cm right paratracheal lymph node. Few additional small mediastinal lymph nodes are noted. The heart is enlarged. No significant pericardial effusion. There is coronary artery and mild thoracic aortic calcification. There is a small/moderate right pleural effusion and a very small left pleural effusion. There are a few scattered tiny small subpleural nodules measuring up to 2 to 3 mm. There is a questionable 4 mm nodule adjacent to vessel the right midlung on image 102, series 3. No central endobronchial lesion identified. Gallstone noted. Impression: Right greater than left pleural effusions. Few small pulmonary nodules, as above. Cardiomegaly. Report title: CT CT Chest WO - 2015 3:09 pm Patient Id: [id number][DEFAULT] Patient name: Filipe Albright : 27 Dec, 1945 Exam date: 2015 2:44 pm Report status: Final 24, 11:40 AM Change [facility] Viewer [email or web address] Few small to mildly enlarged mediastinal lymph nodes. Electronically signed by ALEN VEGAS MD 2015 3:09 PM Interpreted By: ALEN VEGAS MD Approved By: ALEN VEGAS MD Approved Date/Time: 2015 03:09 PM THIS REPORT WAS RECEIVED FROM AN EXTERNAL RIS SYSTEM

## 2024-08-01 NOTE — HISTORY OF PRESENT ILLNESS
[Former] : former [>= 20 pack years] : >= 20 pack years [TextBox_4] : ELMO ALBRIGHT is a 78 year old male who presents with daughter fotr ct chest results PMH:  afib/ aicd/ppm, on eliquis BID, former heavy smoker, quit about 30 yeras ago history of pleural effusion- no thoracentesis, medically managed  saw Dr Ro for ankle swelling, on meds for that  CT chest done her efor eval   no resp symptoms + nocturia unsure if he snores no formal sleep testing done  not on any inhalers    [YearQuit] : 1990

## 2024-08-01 NOTE — ASSESSMENT
[FreeTextEntry1] : snoring- horn snot want to do HST high irsk for estella risk of  untreated estella discussed especially in setting of his other medical probelms he refuses HST

## 2024-08-08 ENCOUNTER — APPOINTMENT (OUTPATIENT)
Dept: PULMONOLOGY | Facility: CLINIC | Age: 79
End: 2024-08-08

## 2024-08-08 PROBLEM — R91.8 PULMONARY NODULES: Status: ACTIVE | Noted: 2024-08-01

## 2024-08-08 PROBLEM — R05.9 COUGH: Status: ACTIVE | Noted: 2024-08-08

## 2024-08-08 PROCEDURE — ZZZZZ: CPT

## 2024-08-08 PROCEDURE — 94729 DIFFUSING CAPACITY: CPT

## 2024-08-08 PROCEDURE — 94727 GAS DIL/WSHOT DETER LNG VOL: CPT

## 2024-08-08 PROCEDURE — 94010 BREATHING CAPACITY TEST: CPT

## 2024-08-08 PROCEDURE — 99214 OFFICE O/P EST MOD 30 MIN: CPT | Mod: 25

## 2024-08-08 NOTE — ASSESSMENT
[FreeTextEntry1] : snoring- does not want to do HST high risk for estella risk of  untreated estella discussed especially in the setting of his other medical problems he refuses HST

## 2024-08-08 NOTE — HISTORY OF PRESENT ILLNESS
[Former] : former [>= 20 pack years] : >= 20 pack years [TextBox_4] : ELMO ALBRIGHT is a 78 year old male who presents for formal pfts seen last week with daughter fotr ct chest results PMH:  afib/ aicd/ppm, on eliquis BID, former heavy smoker, quit about 30 yeras ago history of pleural effusion- no thoracentesis, medically managed  saw Dr Ro for ankle swelling, on meds for that  CT chest done- nodules noted- will f/u nxt year in 2025  smoking history, no resp complaitns did pfts  no formal sleep testing done  not on any inhalers    [YearQuit] : 1990

## 2024-08-08 NOTE — PROCEDURE
[FreeTextEntry1] : 2024 normal flow rates, mild high rv/tlc, normal dlco  previous data reviewed:  CT scan of the chest without contrast History: Pulmonary nodules Comparison: CT scan of the chest performed on 9-18-15 Technique: Axial scans sections were obtained through the chest using a multislice spiral CT scanner. Axial high-resolution and coronal and sagittal reconstructions were obtained. Dose reduction software was used to minimize patient radiation exposure. Radiation dose: Total exam DLP: 372 mGy/cm. Findings: Lungs: Many of the scans particularly through the mid and lower lung fields are degraded by respiratory motion artifact. There are a few small noncalcified and calcified subpleural lung nodules along the posterior pleural surface of the right lower lobe including a 3 mm noncalcified nodule along the posteromedial pleural surface (series 4/image #97), another 2 mm noncalcified subpleural nodule slightly more inferiorly along the posteromedial pleural surface the right lower lobe (series 4/image #111), a 2 mm calcified subpleural granuloma along the posterior pleural surface of the right lower lobe (series 4/image #91), and a 3 mm noncalcified nodule along the posterolateral pleural surface of the right lower lobe (series 4/image #78). These nodules were not seen on the prior CT study however were probably obscured by a prior small to moderate sized pleural effusion which has since resolved. There are two small zakia-fissural nodules measuring 4 mm and 3 mm along the superior left major fissure (series 4/images #41,43). There is a 4 mm noncalcified subpleural nodule along the lateral pleural surface of the right lower lobe (series 4/image #99), a 2 mm noncalcified lung nodule laterally within the right middle lobe (series 4/image #75), and a 2 mm noncalcified subpleural nodule along the lateral pleural surface the left lower lobe (series 4/image #87). No infiltrates or consolidations are seen. Mediastinum: Few small mediastinal lymph nodes within the precarinal and prevascular regions a few of which appear diminished in size when compared to the prior CT study. [place]: Negative (noncontrast) Cardiac: Left-sided cardiac defibrillator. Atherosclerotic calcification within the thoracic aorta and coronary arteries. No evidence of a thoracic aortic aneurysm. There is fluid surrounding the ascending thoracic aorta probably within pericardial recesses. Pleura: Interval resolution of a small to moderate sized right pleural effusion and small left pleural effusion since the prior CT study. Bony thorax: Degenerative changes within the thoracic spine. Other: Negative Report title: CT CT CHEST WO IV CONTRAST - 2024 11:34 am Patient Id: [id number][DEFAULT] Patient name: Filipe Albright : 27 Dec, 1945 Exam date: 2024 10:51 am Report status: Final 24, 11:41 AM [employer] Viewer [email or web address] IMPRESSION: Few small mostly noncalcified subpleural lung nodules along the posterior and posterolateral pleural surfaces the right lower lobe which were not seen on the prior CT study of 9-18-15 however were probably obscured by the small to moderate sized right pleural effusion which has since resolved. Two new small zakia-fissural nodules along the superior left major fissure. Stable few other small scattered noncalcified lung nodules in both lungs. Continued follow-up with a repeat CT scan of the chest in 12 months is recommended. Few small mediastinal lymph nodes within the precarinal and prevascular regions a few of which appear diminished in size when compared to the prior CT study. Electronically signed by Jh Amador MD 2024 11:34 AM    Ref. Physician: Jorge Luis Ro [id number] [address] [place] [id number] Ref. Physician: JORGE LUIS RO MD NPI: [id number] INAL REPORT ********** Org: [facility] - Radiology Report Patient Name: FILIPE ALBRIGHT MRN: [id number] Accession: [id number] Exam Desc: CT Chest WO Exam Date/Time: 2015 02:53 PM Requesting Physician: JORGE LUIS RO MD Reason for Exam: COUGH CT scan chest. Clinical history: Difficulty breathing. CT scan chest performed without contrast. No evidence of significant axillary or hilar lymphadenopathy on this noncontrast examination. There is a 1.7 x 1 cm right paratracheal lymph node. Few additional small mediastinal lymph nodes are noted. The heart is enlarged. No significant pericardial effusion. There is coronary artery and mild thoracic aortic calcification. There is a small/moderate right pleural effusion and a very small left pleural effusion. There are a few scattered tiny small subpleural nodules measuring up to 2 to 3 mm. There is a questionable 4 mm nodule adjacent to vessel the right midlung on image 102, series 3. No central endobronchial lesion identified. Gallstone noted. Impression: Right greater than left pleural effusions. Few small pulmonary nodules, as above. Cardiomegaly. Report title: CT CT Chest WO - 2015 3:09 pm Patient Id: [id number][DEFAULT] Patient name: Filipe Albright : 27 Dec, 1945 Exam date: 2015 2:44 pm Report status: Final 24, 11:40 AM [employer] Viewer [email or web address] Few small to mildly enlarged mediastinal lymph nodes. Electronically signed by ALEN VEGAS MD 2015 3:09 PM Interpreted By: ALEN VEGAS MD Approved By: ALEN VEGAS MD Approved Date/Time: 2015 03:09 PM THIS REPORT WAS RECEIVED FROM AN EXTERNAL RIS SYSTEM

## 2024-11-08 ENCOUNTER — APPOINTMENT (OUTPATIENT)
Dept: UROLOGY | Facility: CLINIC | Age: 79
End: 2024-11-08
Payer: MEDICARE

## 2024-11-08 VITALS
HEART RATE: 66 BPM | SYSTOLIC BLOOD PRESSURE: 171 MMHG | DIASTOLIC BLOOD PRESSURE: 93 MMHG | BODY MASS INDEX: 33.75 KG/M2 | OXYGEN SATURATION: 98 % | HEIGHT: 66 IN | TEMPERATURE: 97.3 F | WEIGHT: 210 LBS

## 2024-11-08 DIAGNOSIS — Z87.891 PERSONAL HISTORY OF NICOTINE DEPENDENCE: ICD-10-CM

## 2024-11-08 DIAGNOSIS — N13.8 BENIGN PROSTATIC HYPERPLASIA WITH LOWER URINARY TRACT SYMPMS: ICD-10-CM

## 2024-11-08 DIAGNOSIS — N40.1 BENIGN PROSTATIC HYPERPLASIA WITH LOWER URINARY TRACT SYMPMS: ICD-10-CM

## 2024-11-08 PROCEDURE — 99203 OFFICE O/P NEW LOW 30 MIN: CPT

## 2024-11-08 PROCEDURE — G2211 COMPLEX E/M VISIT ADD ON: CPT

## 2025-02-06 ENCOUNTER — APPOINTMENT (OUTPATIENT)
Dept: PULMONOLOGY | Facility: CLINIC | Age: 80
End: 2025-02-06